# Patient Record
Sex: MALE | Race: WHITE | NOT HISPANIC OR LATINO | URBAN - METROPOLITAN AREA
[De-identification: names, ages, dates, MRNs, and addresses within clinical notes are randomized per-mention and may not be internally consistent; named-entity substitution may affect disease eponyms.]

---

## 2024-11-04 ENCOUNTER — OFFICE VISIT (OUTPATIENT)
Dept: URGENT CARE | Age: 24
End: 2024-11-04
Payer: COMMERCIAL

## 2024-11-04 VITALS
WEIGHT: 188 LBS | RESPIRATION RATE: 18 BRPM | TEMPERATURE: 97.6 F | HEIGHT: 71 IN | BODY MASS INDEX: 26.32 KG/M2 | SYSTOLIC BLOOD PRESSURE: 125 MMHG | DIASTOLIC BLOOD PRESSURE: 83 MMHG | OXYGEN SATURATION: 99 % | HEART RATE: 78 BPM

## 2024-11-04 DIAGNOSIS — S05.01XA ABRASION OF RIGHT CORNEA, INITIAL ENCOUNTER: Primary | ICD-10-CM

## 2024-11-04 PROCEDURE — 99203 OFFICE O/P NEW LOW 30 MIN: CPT | Performed by: NURSE PRACTITIONER

## 2024-11-04 PROCEDURE — 3008F BODY MASS INDEX DOCD: CPT | Performed by: NURSE PRACTITIONER

## 2024-11-04 RX ORDER — ERYTHROMYCIN 5 MG/G
OINTMENT OPHTHALMIC EVERY 6 HOURS
Qty: 3 G | Refills: 0 | Status: SHIPPED | OUTPATIENT
Start: 2024-11-04 | End: 2024-11-11

## 2024-11-04 ASSESSMENT — ENCOUNTER SYMPTOMS
EYE DISCHARGE: 1
EYE REDNESS: 1
EYE ITCHING: 1

## 2024-11-04 ASSESSMENT — PAIN SCALES - GENERAL: PAINLEVEL_OUTOF10: 10-WORST PAIN EVER

## 2024-11-04 NOTE — PROGRESS NOTES
"Subjective   Patient ID: Bernabe Coelho is a 24 y.o. male. They present today with a chief complaint of Eye Problem (4 days).    History of Present Illness  Bernabe Coelho is a 24 y.o. male who presents to the clinic for right eye erythema and irritation. Pt states he was chopping wood on Saturday felt a small wood chip go into his eye. Pt flushed his eye and does not feel there is any FB in the eye at this time. Pt is having intermittent blurred vision and excessive tearing to the right eye. Pt denies any vision changes at this time. Pt does not wear contact lenses.  Pt denies any chest pain, sob, N/V at this time in clinic.         Eye Problem  Associated symptoms: discharge, itching and redness        Past Medical History  Allergies as of 11/04/2024    (No Known Allergies)       (Not in a hospital admission)       No past medical history on file.    No past surgical history on file.         Review of Systems  Review of Systems   Eyes:  Positive for discharge, redness and itching.   All other systems reviewed and are negative.                                 Objective    Vitals:    11/04/24 0859   BP: 125/83   Pulse: 78   Resp: 18   Temp: 36.4 °C (97.6 °F)   TempSrc: Temporal   SpO2: 99%   Weight: 85.3 kg (188 lb)   Height: 1.803 m (5' 11\")     No LMP for male patient.    Physical Exam  Constitutional:       Appearance: Normal appearance.   Eyes:      General: Lids are normal. Lids are everted, no foreign bodies appreciated.         Right eye: Discharge present. No foreign body.      Conjunctiva/sclera:      Right eye: Right conjunctiva is injected.   Neurological:      General: No focal deficit present.      Mental Status: He is alert and oriented to person, place, and time. Mental status is at baseline.   Psychiatric:         Mood and Affect: Mood normal.         Behavior: Behavior normal.         Procedures  2 gtt of tetracaine placed in right eye  No FB visualized  Fluorescein placed in right eye, " uptake to 1800 position of the cornea.    Point of Care Test & Imaging Results from this visit  No results found for this visit on 11/04/24.   No results found.    Diagnostic study results (if any) were reviewed by JOSE J Helm.    Assessment/Plan   Allergies, medications, history, and pertinent labs/EKGs/Imaging reviewed by JOSE J Helm.     Medical Decision Making  Signs and symptoms consistent with corneal abrasion. No evidence of corneal foreign body or infection of the eye. Will treat with antibiotic drops prophylactically. Follow with PCP/ophthalmology within 3-5 days for recheck. Return to clinic or go to ED if symptoms change or worsen.       Orders and Diagnoses  Diagnoses and all orders for this visit:  Abrasion of right cornea, initial encounter  -     erythromycin (Romycin) 5 mg/gram (0.5 %) ophthalmic ointment; Apply to right eye every 6 hours for 7 days. Apply Amount per Dose: 0.25 inch (~0.5 cm) per dose.  -     Referral to Ophthalmology; Future      Medical Admin Record      Patient disposition: Home    Electronically signed by JOSE J Helm  2:05 PM

## 2024-11-04 NOTE — PATIENT INSTRUCTIONS
Erythromycin ointment  Follow up in 3-5 days with optho  If worsening symptoms, please go to ER    Please follow up with your primary provider within one week if symptoms do not improve.  You may schedule an appointment online at [email or web address] or call [phone]. Go to the Emergency Department if symptoms significantly worsen or if you develop chest pain or shortness of breath.

## 2024-11-07 ENCOUNTER — OFFICE VISIT (OUTPATIENT)
Dept: OPHTHALMOLOGY | Facility: CLINIC | Age: 24
End: 2024-11-07
Payer: COMMERCIAL

## 2024-11-07 DIAGNOSIS — S05.01XA ABRASION OF RIGHT CORNEA, INITIAL ENCOUNTER: Primary | ICD-10-CM

## 2024-11-07 PROCEDURE — 99203 OFFICE O/P NEW LOW 30 MIN: CPT | Performed by: OPTOMETRIST

## 2024-11-07 RX ORDER — MOXIFLOXACIN 5 MG/ML
1 SOLUTION/ DROPS OPHTHALMIC 4 TIMES DAILY
Qty: 3 ML | Refills: 0 | Status: SHIPPED | OUTPATIENT
Start: 2024-11-07

## 2024-11-07 ASSESSMENT — ENCOUNTER SYMPTOMS
EYES NEGATIVE: 1
HEMATOLOGIC/LYMPHATIC NEGATIVE: 0
RESPIRATORY NEGATIVE: 0
PSYCHIATRIC NEGATIVE: 0
ENDOCRINE NEGATIVE: 0
NEUROLOGICAL NEGATIVE: 0
GASTROINTESTINAL NEGATIVE: 0
CARDIOVASCULAR NEGATIVE: 0
ALLERGIC/IMMUNOLOGIC NEGATIVE: 0
CONSTITUTIONAL NEGATIVE: 0
MUSCULOSKELETAL NEGATIVE: 0

## 2024-11-07 ASSESSMENT — CONF VISUAL FIELD
OS_INFERIOR_TEMPORAL_RESTRICTION: 0
OD_SUPERIOR_NASAL_RESTRICTION: 0
OS_SUPERIOR_NASAL_RESTRICTION: 0
OD_INFERIOR_NASAL_RESTRICTION: 0
OD_NORMAL: 1
OS_SUPERIOR_TEMPORAL_RESTRICTION: 0
OD_SUPERIOR_TEMPORAL_RESTRICTION: 0
OS_NORMAL: 1
OS_INFERIOR_NASAL_RESTRICTION: 0
OD_INFERIOR_TEMPORAL_RESTRICTION: 0

## 2024-11-07 ASSESSMENT — VISUAL ACUITY
OS_SC: 20/20
OD_SC+: +2
METHOD: SNELLEN - LINEAR
OS_SC+: -1
OD_SC: 20/60

## 2024-11-07 ASSESSMENT — TONOMETRY
OS_IOP_MMHG: 15
IOP_METHOD: TONOPEN
OD_IOP_MMHG: 14

## 2024-11-07 ASSESSMENT — SLIT LAMP EXAM - LIDS
COMMENTS: NORMAL
COMMENTS: NORMAL

## 2024-11-07 ASSESSMENT — EXTERNAL EXAM - RIGHT EYE: OD_EXAM: NORMAL

## 2024-11-07 ASSESSMENT — EXTERNAL EXAM - LEFT EYE: OS_EXAM: NORMAL

## 2024-11-07 NOTE — PROGRESS NOTES
Assessment/Plan   Problem List Items Addressed This Visit          Eye/Vision problems    Right corneal abrasion - Primary     Pt presents as followup from his  urgent care appointment for corneal abrasion. Anterior findings today show 2+ conjunctival injection and mild staining. No true positive NaFl defect noted today - only mild peripheral corneal superficial punctate keratitis (SPK). Mild inferior nasal area of corneal edema.  Pt educated on findings. Right eye was rinsed in office today with eye wash solution. Discontinue erythromycin melvin today - start moxifloxacin QID and PFAT Q1-2hour. Sample of PFAT given. RTC 1 week for followup or sooner if symptoms worsen. Pt voiced understanding.          Relevant Medications    moxifloxacin (Vigamox) 0.5 % ophthalmic solution

## 2024-11-07 NOTE — ASSESSMENT & PLAN NOTE
Pt presents as followup from his  urgent care appointment for corneal abrasion. Anterior findings today show 2+ conjunctival injection and mild staining. No true positive NaFl defect noted today - only mild peripheral corneal superficial punctate keratitis (SPK). Mild inferior nasal area of corneal edema.  Pt educated on findings. Right eye was rinsed in office today with eye wash solution. Discontinue erythromycin melvin today - start moxifloxacin QID and PFAT Q1-2hour. Sample of PFAT given. RTC 1 week for followup or sooner if symptoms worsen. Pt voiced understanding.

## 2024-11-15 ENCOUNTER — APPOINTMENT (OUTPATIENT)
Dept: OPHTHALMOLOGY | Facility: CLINIC | Age: 24
End: 2024-11-15
Payer: COMMERCIAL